# Patient Record
Sex: MALE
[De-identification: names, ages, dates, MRNs, and addresses within clinical notes are randomized per-mention and may not be internally consistent; named-entity substitution may affect disease eponyms.]

---

## 2021-07-14 PROBLEM — Z00.00 ENCOUNTER FOR PREVENTIVE HEALTH EXAMINATION: Status: ACTIVE | Noted: 2021-07-14

## 2021-07-28 ENCOUNTER — APPOINTMENT (OUTPATIENT)
Dept: UROLOGY | Facility: CLINIC | Age: 21
End: 2021-07-28

## 2022-09-19 ENCOUNTER — APPOINTMENT (OUTPATIENT)
Dept: UROLOGY | Facility: CLINIC | Age: 22
End: 2022-09-19

## 2022-09-19 VITALS
WEIGHT: 164 LBS | HEART RATE: 67 BPM | DIASTOLIC BLOOD PRESSURE: 59 MMHG | BODY MASS INDEX: 21.05 KG/M2 | HEIGHT: 74 IN | SYSTOLIC BLOOD PRESSURE: 123 MMHG

## 2022-09-19 DIAGNOSIS — I86.1 SCROTAL VARICES: ICD-10-CM

## 2022-09-19 DIAGNOSIS — Z78.9 OTHER SPECIFIED HEALTH STATUS: ICD-10-CM

## 2022-09-19 PROCEDURE — 99203 OFFICE O/P NEW LOW 30 MIN: CPT

## 2022-09-19 NOTE — LETTER HEADER
[FreeTextEntry3] : Terrance Johns M.D.\par Director Emeritus of Urology\par Director of Male Infertility\par [facility]\par [address] [place] [address], [facility] [address]\[place] [address]

## 2022-09-19 NOTE — ASSESSMENT
[FreeTextEntry1] : He has bilateral varicoceles however, he has normal size testicles.  His past pain was unlikely to be from his varicoceles.  He is getting  in November and is concerned about the risk of difficulty conceiving.  He understands that just because he has a varicocele does not necessarily mean he will have fertility problems.  He is getting  in November and they will try for children.  If he has difficulty in 6 months he will follow-up we will discuss options then.  They are aware that in the absence of any abnormalities it can take up to a year before we consider a couple to be having trouble but if he is anxious in 6 months he will come in then and we can arrange some baseline testing testing that is his [place] we would have trouble getting done while he is single but we should not have trouble doing what is necessary once he is .

## 2022-09-19 NOTE — HISTORY OF PRESENT ILLNESS
[None] : no symptoms [FreeTextEntry1] : Luan is a 22-year-old male who presents for evaluation of varicoceles.\par \par His mother states that when he was younger he was diagnosed with varicoceles and followed by a pediatric urologist at [facility].  It is unclear when his last examination however, he has known about this for 10 years and does not believe he has been seen for most of the time.\par \par He states about 1 year ago he developed discomfort while he was in [place].  It was after period of lengthy walking.  He was sent to see a urologist given his history of a left varicocele.  A supine scrotal ultrasound demonstrated a unilateral 3 to 4 mm left varicocele.  He is unsure of how this examination was done and whether he was lying down, standing up, or combination of the both.  They did not think about it but did not think that the pain was secondary to the varicocele. His pain lasted about 1 month however, it resolved and has not returned.  He cannot recall if the pain was more in the morning of the afternoon more upright while lying down related to carrying etc. \par \par Overall from a  wound viewpoint he tells us that he is voiding well, denies any bothersome lower urinary tract symptoms, abdominal/flank pain, supra pubic discomfort, gross hematuria, dysuria, nausea, vomiting, chills, fever, lightheadedness, dizziness, further urological/constitutional symptoms.

## 2022-09-19 NOTE — PHYSICAL EXAM
[General Appearance - Well Developed] : well developed [General Appearance - Well Nourished] : well nourished [Normal Appearance] : normal appearance [Well Groomed] : well groomed [General Appearance - In No Acute Distress] : no acute distress [Edema] : no peripheral edema [Respiration, Rhythm And Depth] : normal respiratory rhythm and effort [Exaggerated Use Of Accessory Muscles For Inspiration] : no accessory muscle use [Abdomen Soft] : soft [Abdomen Tenderness] : non-tender [Costovertebral Angle Tenderness] : no ~M costovertebral angle tenderness [Urethral Meatus] : meatus normal [Penis Abnormality] : normal circumcised penis [Urinary Bladder Findings] : the bladder was normal on palpation [Scrotum] : the scrotum was normal [Testes Tenderness] : no tenderness of the testes [Testes Mass (___cm)] : there were no testicular masses [Normal Station and Gait] : the gait and station were normal for the patient's age [] : no rash [No Focal Deficits] : no focal deficits [Oriented To Time, Place, And Person] : oriented to person, place, and time [Affect] : the affect was normal [Mood] : the mood was normal [Not Anxious] : not anxious [Femoral Lymph Nodes Enlarged Bilaterally] : femoral [Inguinal Lymph Nodes Enlarged Bilaterally] : inguinal [Heart Rate And Rhythm] : Heart rate and rhythm were normal [Auscultation Breath Sounds / Voice Sounds] : lungs were clear to auscultation bilaterally [Abdomen Hernia] : no hernia was discovered [FreeTextEntry1] : Grade 3 varicocele on the left, grade 1-2 on the right.  Normal size testicles bilaterally at 30-34 cc within normal consistency.

## 2022-09-19 NOTE — LETTER BODY
[Dear  ___] : Dear  [unfilled], [Consult Letter:] : I had the pleasure of evaluating your patient, [unfilled]. [Please see my note below.] : Please see my note below. [Consult Closing:] : Thank you very much for allowing me to participate in the care of this patient.  If you have any questions, please do not hesitate to contact me. [Sincerely,] : Sincerely, [FreeTextEntry2] : Dr. [place] Wade MD\par [address]\par [place] [address]